# Patient Record
Sex: MALE | Race: WHITE | URBAN - METROPOLITAN AREA
[De-identification: names, ages, dates, MRNs, and addresses within clinical notes are randomized per-mention and may not be internally consistent; named-entity substitution may affect disease eponyms.]

---

## 2017-02-09 ENCOUNTER — APPOINTMENT (OUTPATIENT)
Dept: URBAN - METROPOLITAN AREA SURGERY 9 | Age: 67
Setting detail: DERMATOLOGY
End: 2017-02-09

## 2017-02-09 DIAGNOSIS — L85.3 XEROSIS CUTIS: ICD-10-CM

## 2017-02-09 PROBLEM — D48.5 NEOPLASM OF UNCERTAIN BEHAVIOR OF SKIN: Status: ACTIVE | Noted: 2017-02-09

## 2017-02-09 PROBLEM — E03.9 HYPOTHYROIDISM, UNSPECIFIED: Status: ACTIVE | Noted: 2017-02-09

## 2017-02-09 PROCEDURE — 11100: CPT

## 2017-02-09 PROCEDURE — OTHER BIOPSY BY SHAVE METHOD: OTHER

## 2017-02-09 PROCEDURE — OTHER COUNSELING: OTHER

## 2017-02-09 PROCEDURE — OTHER PATHOLOGY BILLING: OTHER

## 2017-02-09 PROCEDURE — 88305 TISSUE EXAM BY PATHOLOGIST: CPT | Mod: TC

## 2017-02-09 PROCEDURE — 99202 OFFICE O/P NEW SF 15 MIN: CPT | Mod: 25

## 2017-02-09 ASSESSMENT — LOCATION SIMPLE DESCRIPTION DERM: LOCATION SIMPLE: RIGHT UPPER BACK

## 2017-02-09 ASSESSMENT — LOCATION ZONE DERM: LOCATION ZONE: TRUNK

## 2017-02-09 ASSESSMENT — LOCATION DETAILED DESCRIPTION DERM: LOCATION DETAILED: RIGHT LATERAL UPPER BACK

## 2021-07-23 ENCOUNTER — HOSPITAL ENCOUNTER (EMERGENCY)
Dept: HOSPITAL 54 - ER | Age: 71
Discharge: NURSING, REHAB OR LONG-TERM CARE | End: 2021-07-23
Payer: COMMERCIAL

## 2021-07-23 VITALS — WEIGHT: 250 LBS | HEIGHT: 67 IN | BODY MASS INDEX: 39.24 KG/M2

## 2021-07-23 VITALS — DIASTOLIC BLOOD PRESSURE: 61 MMHG | SYSTOLIC BLOOD PRESSURE: 141 MMHG

## 2021-07-23 DIAGNOSIS — Z88.2: ICD-10-CM

## 2021-07-23 DIAGNOSIS — R31.9: ICD-10-CM

## 2021-07-23 DIAGNOSIS — Z79.82: ICD-10-CM

## 2021-07-23 DIAGNOSIS — Z79.899: ICD-10-CM

## 2021-07-23 DIAGNOSIS — E11.9: ICD-10-CM

## 2021-07-23 DIAGNOSIS — Z79.84: ICD-10-CM

## 2021-07-23 DIAGNOSIS — N36.9: Primary | ICD-10-CM

## 2021-07-23 LAB
BASOPHILS # BLD AUTO: 0.1 K/UL (ref 0–0.2)
BASOPHILS NFR BLD AUTO: 0.6 % (ref 0–2)
BILIRUB UR QL STRIP: NEGATIVE
BUN SERPL-MCNC: 18 MG/DL (ref 7–18)
CALCIUM SERPL-MCNC: 9.2 MG/DL (ref 8.5–10.1)
CHLORIDE SERPL-SCNC: 102 MMOL/L (ref 98–107)
CO2 SERPL-SCNC: 30 MMOL/L (ref 21–32)
COLOR UR: YELLOW
CREAT SERPL-MCNC: 1.1 MG/DL (ref 0.6–1.3)
DEPRECATED SQUAMOUS URNS QL MICRO: (no result) /HPF
EOSINOPHIL NFR BLD AUTO: 4 % (ref 0–6)
GLUCOSE SERPL-MCNC: 176 MG/DL (ref 74–106)
GLUCOSE UR STRIP-MCNC: NEGATIVE MG/DL
HCT VFR BLD AUTO: 44 % (ref 39–51)
HGB BLD-MCNC: 14.4 G/DL (ref 13.5–17.5)
LEUKOCYTE ESTERASE UR QL STRIP: (no result)
LYMPHOCYTES NFR BLD AUTO: 1.8 K/UL (ref 0.8–4.8)
LYMPHOCYTES NFR BLD AUTO: 21.8 % (ref 20–44)
MCHC RBC AUTO-ENTMCNC: 33 G/DL (ref 31–36)
MCV RBC AUTO: 94 FL (ref 80–96)
MONOCYTES NFR BLD AUTO: 0.7 K/UL (ref 0.1–1.3)
MONOCYTES NFR BLD AUTO: 8.3 % (ref 2–12)
NEUTROPHILS # BLD AUTO: 5.4 K/UL (ref 1.8–8.9)
NEUTROPHILS NFR BLD AUTO: 65.3 % (ref 43–81)
NITRITE UR QL STRIP: NEGATIVE
PH UR STRIP: 5.5 [PH] (ref 5–8)
PLATELET # BLD AUTO: 326 K/UL (ref 150–450)
POTASSIUM SERPL-SCNC: 4.1 MMOL/L (ref 3.5–5.1)
PROT UR QL STRIP: 30 MG/DL
RBC # BLD AUTO: 4.68 MIL/UL (ref 4.5–6)
RBC #/AREA URNS HPF: (no result) /HPF (ref 0–2)
SODIUM SERPL-SCNC: 139 MMOL/L (ref 136–145)
UROBILINOGEN UR STRIP-MCNC: 0.2 EU/DL
WBC #/AREA URNS HPF: (no result) /HPF
WBC #/AREA URNS HPF: (no result) /HPF (ref 0–3)
WBC NRBC COR # BLD AUTO: 8.2 K/UL (ref 4.3–11)

## 2021-07-23 NOTE — NUR
PHLEBOTOMIST AT BEDSIDE. URINE SAMPLE OBTAINED FROM NI BAG, WHICH WAS 
CHANGED YESTERDAY AT THE FACILITY.

## 2021-07-23 NOTE — NUR
REPORT GIVEN TO EMT. Patient discharged to FACILITY in stable condition. 
Written and verbal after care instructions given. Patient verbalizes 
understanding of instruction.

## 2022-07-22 ENCOUNTER — HOSPITAL ENCOUNTER (EMERGENCY)
Dept: HOSPITAL 54 - ER | Age: 72
LOS: 1 days | Discharge: HOME | End: 2022-07-23
Payer: MEDICARE

## 2022-07-22 VITALS — HEIGHT: 60 IN | BODY MASS INDEX: 46.53 KG/M2 | WEIGHT: 237 LBS

## 2022-07-22 DIAGNOSIS — Z88.8: ICD-10-CM

## 2022-07-22 DIAGNOSIS — E11.9: ICD-10-CM

## 2022-07-22 DIAGNOSIS — N39.0: ICD-10-CM

## 2022-07-22 DIAGNOSIS — Z79.899: ICD-10-CM

## 2022-07-22 DIAGNOSIS — Z88.2: ICD-10-CM

## 2022-07-22 DIAGNOSIS — R33.9: Primary | ICD-10-CM

## 2022-07-22 DIAGNOSIS — E78.5: ICD-10-CM

## 2022-07-22 PROCEDURE — 87086 URINE CULTURE/COLONY COUNT: CPT

## 2022-07-22 PROCEDURE — 51702 INSERT TEMP BLADDER CATH: CPT

## 2022-07-22 PROCEDURE — 81001 URINALYSIS AUTO W/SCOPE: CPT

## 2022-07-22 PROCEDURE — 87186 SC STD MICRODIL/AGAR DIL: CPT

## 2022-07-22 PROCEDURE — 99284 EMERGENCY DEPT VISIT MOD MDM: CPT

## 2022-07-23 VITALS — DIASTOLIC BLOOD PRESSURE: 79 MMHG | SYSTOLIC BLOOD PRESSURE: 140 MMHG

## 2022-07-23 LAB
BILIRUB UR QL STRIP: (no result)
COLOR UR: (no result)
GLUCOSE UR STRIP-MCNC: >=1000 MG/DL
LEUKOCYTE ESTERASE UR QL STRIP: NEGATIVE
NITRITE UR QL STRIP: POSITIVE
PH UR STRIP: 5.5 [PH] (ref 5–8)
PROT UR QL STRIP: 100 MG/DL
RBC #/AREA URNS HPF: (no result) /HPF (ref 0–2)
UROBILINOGEN UR STRIP-MCNC: 0.2 EU/DL
WBC #/AREA URNS HPF: (no result) /HPF (ref 0–3)

## 2022-07-25 ENCOUNTER — HOSPITAL ENCOUNTER (EMERGENCY)
Dept: HOSPITAL 54 - ER | Age: 72
Discharge: NURSING, REHAB OR LONG-TERM CARE | End: 2022-07-25
Payer: COMMERCIAL

## 2022-07-25 VITALS — BODY MASS INDEX: 33.18 KG/M2 | HEIGHT: 71 IN | WEIGHT: 237 LBS

## 2022-07-25 VITALS — DIASTOLIC BLOOD PRESSURE: 68 MMHG | SYSTOLIC BLOOD PRESSURE: 124 MMHG

## 2022-07-25 DIAGNOSIS — Z79.82: ICD-10-CM

## 2022-07-25 DIAGNOSIS — R33.9: Primary | ICD-10-CM

## 2022-07-25 DIAGNOSIS — E78.5: ICD-10-CM

## 2022-07-25 DIAGNOSIS — E11.9: ICD-10-CM

## 2022-07-25 DIAGNOSIS — Z88.2: ICD-10-CM

## 2022-07-25 DIAGNOSIS — Z79.899: ICD-10-CM

## 2022-07-25 DIAGNOSIS — Z88.8: ICD-10-CM

## 2022-07-25 PROCEDURE — 99283 EMERGENCY DEPT VISIT LOW MDM: CPT

## 2022-07-25 NOTE — NUR
DEBRA FROM SNF/snf W/ C/O SLOW-DRAINING URINARY CATHETER. PT W/ INDWELLING 
URINARY CATH ATTACHED TO LEG BAG, W/ DARK TEA-COLORED URINE. TO ER BED 2.

## 2022-07-25 NOTE — NUR
PT SEEN BY DR. SIMPSON. PER MD, URINARY CATH IS DRAINING OKAY AND NO NEED TO 
CHANGE CATHETER AT THIS TIME.

## 2022-07-25 NOTE — NUR
IRRIGATED NI CATH W/ 200CC NS. ABLE TO DRAIN URINE, TEA COLORED, 600CC IN 
AMOUNT. PT VERBALIZED RELIEF; NO BLADDER DISTENTION NOTED.

## 2022-07-25 NOTE — NUR
Patient discharged back to [facility] via private ambulance in stable condition. 
Written and verbal after care instructions given. Patient verbalizes 
understanding of instruction.

## 2022-07-30 ENCOUNTER — HOSPITAL ENCOUNTER (EMERGENCY)
Dept: HOSPITAL 54 - ER | Age: 72
Discharge: HOME | End: 2022-07-30
Payer: COMMERCIAL

## 2022-07-30 VITALS — WEIGHT: 211 LBS | HEIGHT: 66 IN | BODY MASS INDEX: 33.91 KG/M2

## 2022-07-30 VITALS — DIASTOLIC BLOOD PRESSURE: 63 MMHG | SYSTOLIC BLOOD PRESSURE: 134 MMHG

## 2022-07-30 DIAGNOSIS — T83.091A: Primary | ICD-10-CM

## 2022-07-30 DIAGNOSIS — Z88.8: ICD-10-CM

## 2022-07-30 DIAGNOSIS — Z79.84: ICD-10-CM

## 2022-07-30 DIAGNOSIS — Z79.82: ICD-10-CM

## 2022-07-30 DIAGNOSIS — Z79.899: ICD-10-CM

## 2022-07-30 DIAGNOSIS — E11.9: ICD-10-CM

## 2022-07-30 DIAGNOSIS — E78.5: ICD-10-CM

## 2022-07-30 DIAGNOSIS — Z88.2: ICD-10-CM

## 2022-07-30 NOTE — NUR
PT'S NI CATH FLUSHED WITH 20ML OF NS. AND OBTAINED PATENCY. URINE OUTP 800ML 
CLEAR YELLOW WITH BALCK SEDIMENTS. NO FOUL ODOR NOTED.

## 2022-07-30 NOTE — NUR
[facility] AT [facility] FOR TRANSPORT BACK TO HIS FACILITY. PT IS IN STABLE 
CONDITION FOR TRANSPORT.

## 2022-12-09 ENCOUNTER — HOSPITAL ENCOUNTER (EMERGENCY)
Dept: HOSPITAL 54 - ER | Age: 72
LOS: 1 days | Discharge: NURSING, REHAB OR LONG-TERM CARE | End: 2022-12-10
Payer: COMMERCIAL

## 2022-12-09 VITALS — BODY MASS INDEX: 33.91 KG/M2 | WEIGHT: 211 LBS | HEIGHT: 66 IN

## 2022-12-09 DIAGNOSIS — Z88.2: ICD-10-CM

## 2022-12-09 DIAGNOSIS — R33.9: Primary | ICD-10-CM

## 2022-12-09 DIAGNOSIS — Z46.6: ICD-10-CM

## 2022-12-09 DIAGNOSIS — E78.2: ICD-10-CM

## 2022-12-09 DIAGNOSIS — Z88.8: ICD-10-CM

## 2022-12-09 DIAGNOSIS — Z79.84: ICD-10-CM

## 2022-12-09 DIAGNOSIS — E11.9: ICD-10-CM

## 2022-12-09 NOTE — NUR
TO ER BED 11. BIB [facility] C/O PENILE PAIN. NO URINE OUTPUT IN F/C SINCE NOON. PT IS 
ALERT AND ORIENTED. RR EVEN AND NONLABORED. CONNECTED TO MONITOR. AWAITING MD SAAVEDRA Yes

## 2022-12-10 VITALS — SYSTOLIC BLOOD PRESSURE: 134 MMHG | DIASTOLIC BLOOD PRESSURE: 71 MMHG

## 2023-01-09 ENCOUNTER — HOSPITAL ENCOUNTER (EMERGENCY)
Dept: HOSPITAL 54 - ER | Age: 73
Discharge: HOME | End: 2023-01-09
Payer: COMMERCIAL

## 2023-01-09 VITALS — SYSTOLIC BLOOD PRESSURE: 135 MMHG | DIASTOLIC BLOOD PRESSURE: 65 MMHG

## 2023-01-09 VITALS — BODY MASS INDEX: 32.1 KG/M2 | HEIGHT: 72 IN | WEIGHT: 237 LBS

## 2023-01-09 DIAGNOSIS — Z79.84: ICD-10-CM

## 2023-01-09 DIAGNOSIS — E11.9: ICD-10-CM

## 2023-01-09 DIAGNOSIS — Z79.82: ICD-10-CM

## 2023-01-09 DIAGNOSIS — Z46.6: Primary | ICD-10-CM

## 2023-01-09 DIAGNOSIS — Z88.2: ICD-10-CM

## 2023-01-09 DIAGNOSIS — Z79.899: ICD-10-CM

## 2023-02-16 ENCOUNTER — HOSPITAL ENCOUNTER (EMERGENCY)
Dept: HOSPITAL 54 - ER | Age: 73
Discharge: NURSING, REHAB OR LONG-TERM CARE | End: 2023-02-16
Payer: COMMERCIAL

## 2023-02-16 VITALS — DIASTOLIC BLOOD PRESSURE: 80 MMHG | SYSTOLIC BLOOD PRESSURE: 142 MMHG

## 2023-02-16 VITALS — HEIGHT: 71 IN | BODY MASS INDEX: 33.6 KG/M2 | WEIGHT: 240 LBS

## 2023-02-16 DIAGNOSIS — E11.9: ICD-10-CM

## 2023-02-16 DIAGNOSIS — Z88.8: ICD-10-CM

## 2023-02-16 DIAGNOSIS — Z88.2: ICD-10-CM

## 2023-02-16 DIAGNOSIS — Z44.8: ICD-10-CM

## 2023-02-16 DIAGNOSIS — Z79.899: ICD-10-CM

## 2023-02-16 DIAGNOSIS — Z79.84: ICD-10-CM

## 2023-02-16 DIAGNOSIS — Z79.82: ICD-10-CM

## 2023-02-16 DIAGNOSIS — R33.9: Primary | ICD-10-CM

## 2023-02-16 DIAGNOSIS — E78.5: ICD-10-CM

## 2023-02-16 LAB
BILIRUB UR QL STRIP: NEGATIVE
COLOR UR: YELLOW
DEPRECATED SQUAMOUS URNS QL MICRO: (no result) /HPF
GLUCOSE UR STRIP-MCNC: (no result) MG/DL
LEUKOCYTE ESTERASE UR QL STRIP: NEGATIVE
NITRITE UR QL STRIP: NEGATIVE
PH UR STRIP: 5.5 [PH] (ref 5–8)
PROT UR QL STRIP: NEGATIVE MG/DL
RBC #/AREA URNS HPF: (no result) /HPF (ref 0–2)
UROBILINOGEN UR STRIP-MCNC: 0.2 EU/DL
WBC #/AREA URNS HPF: (no result) /HPF (ref 0–3)

## 2023-02-16 PROCEDURE — 99284 EMERGENCY DEPT VISIT MOD MDM: CPT

## 2023-02-16 PROCEDURE — 81001 URINALYSIS AUTO W/SCOPE: CPT

## 2023-02-16 PROCEDURE — 51702 INSERT TEMP BLADDER CATH: CPT

## 2023-02-16 NOTE — NUR
NI CATHETER REPLACED WITH 14 FR COUDEE ATTACHED TO LEG BAG. GOOD URINE 
RETURN, CLEAR AND NO HEMATURIA. URINE SAMPLE COLLECTED AND SENT TO LAB

## 2023-02-16 NOTE — NUR
PT GOING BACK TO [facility]. ADDRESS: [address] [place], [place] [address] 
[id number]. NUMBER: [phone]

## 2023-02-16 NOTE — NUR
BIB [facility] UNIT NUMBER [id number] FROM [facility] FOR URINARY CATHER 
CHANGE. PT DENIES DYSURIA, HEMATURIA. PT TRANSFERRED TO BED. CONNECTED TO 
MONITOR. SAFETY PRECAUTIONS PLACED. AWAITING MD ORDERS.

## 2023-02-16 NOTE — NUR
Patient discharged to [facility] via [facility] in stable condition 
accompanied by 2 emts. Written and verbal after care instructions given. 
Patient verbalizes understanding of instruction.

## 2023-03-27 ENCOUNTER — HOSPITAL ENCOUNTER (EMERGENCY)
Dept: HOSPITAL 54 - ER | Age: 73
Discharge: HOME | End: 2023-03-27
Payer: COMMERCIAL

## 2023-03-27 VITALS — DIASTOLIC BLOOD PRESSURE: 68 MMHG | SYSTOLIC BLOOD PRESSURE: 127 MMHG

## 2023-03-27 VITALS — HEIGHT: 71 IN | BODY MASS INDEX: 33.18 KG/M2 | WEIGHT: 237 LBS

## 2023-03-27 DIAGNOSIS — Z79.899: ICD-10-CM

## 2023-03-27 DIAGNOSIS — E11.9: ICD-10-CM

## 2023-03-27 DIAGNOSIS — Z88.8: ICD-10-CM

## 2023-03-27 DIAGNOSIS — N39.0: ICD-10-CM

## 2023-03-27 DIAGNOSIS — Z88.2: ICD-10-CM

## 2023-03-27 DIAGNOSIS — Z79.82: ICD-10-CM

## 2023-03-27 DIAGNOSIS — T83.098A: Primary | ICD-10-CM
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
## 2023-03-27 LAB
BILIRUB UR QL STRIP: NEGATIVE
COLOR UR: YELLOW
DEPRECATED SQUAMOUS URNS QL MICRO: (no result) /HPF
GLUCOSE UR STRIP-MCNC: (no result) MG/DL
LEUKOCYTE ESTERASE UR QL STRIP: (no result)
NITRITE UR QL STRIP: POSITIVE
PH UR STRIP: 5.5 [PH] (ref 5–8)
PROT UR QL STRIP: NEGATIVE MG/DL
RBC #/AREA URNS HPF: (no result) /HPF (ref 0–2)
UROBILINOGEN UR STRIP-MCNC: 0.2 EU/DL
WBC #/AREA URNS HPF: (no result) /HPF

## 2023-04-28 ENCOUNTER — HOSPITAL ENCOUNTER (EMERGENCY)
Dept: HOSPITAL 54 - ER | Age: 73
Discharge: HOME | End: 2023-04-28
Payer: COMMERCIAL

## 2023-04-28 VITALS — SYSTOLIC BLOOD PRESSURE: 125 MMHG | DIASTOLIC BLOOD PRESSURE: 68 MMHG

## 2023-04-28 VITALS — BODY MASS INDEX: 35.21 KG/M2 | WEIGHT: 260 LBS | HEIGHT: 72 IN

## 2023-04-28 DIAGNOSIS — Z44.8: Primary | ICD-10-CM

## 2023-04-28 DIAGNOSIS — E11.9: ICD-10-CM

## 2023-04-28 DIAGNOSIS — Z88.1: ICD-10-CM

## 2023-04-28 DIAGNOSIS — Z79.82: ICD-10-CM

## 2023-04-28 DIAGNOSIS — Z79.84: ICD-10-CM

## 2023-04-28 DIAGNOSIS — Z79.899: ICD-10-CM

## 2023-04-28 DIAGNOSIS — E78.5: ICD-10-CM

## 2023-04-28 DIAGNOSIS — Z88.2: ICD-10-CM

## 2023-04-28 NOTE — NUR
Patient discharged to home in stable condition via [facility]. Written and 
verbal after care instructions given. Patient verbalizes understanding of 
instruction.

## 2023-04-29 ENCOUNTER — HOSPITAL ENCOUNTER (EMERGENCY)
Dept: HOSPITAL 54 - ER | Age: 73
Discharge: NURSING, REHAB OR LONG-TERM CARE | End: 2023-04-29
Payer: COMMERCIAL

## 2023-04-29 VITALS — DIASTOLIC BLOOD PRESSURE: 91 MMHG | SYSTOLIC BLOOD PRESSURE: 146 MMHG

## 2023-04-29 VITALS — BODY MASS INDEX: 35.21 KG/M2 | HEIGHT: 72 IN | WEIGHT: 260 LBS

## 2023-04-29 DIAGNOSIS — Z79.899: ICD-10-CM

## 2023-04-29 DIAGNOSIS — R33.9: ICD-10-CM

## 2023-04-29 DIAGNOSIS — Z79.82: ICD-10-CM

## 2023-04-29 DIAGNOSIS — Z88.2: ICD-10-CM

## 2023-04-29 DIAGNOSIS — E11.9: ICD-10-CM

## 2023-04-29 DIAGNOSIS — T83.028A: Primary | ICD-10-CM

## 2023-04-29 DIAGNOSIS — Z88.1: ICD-10-CM

## 2025-02-11 ENCOUNTER — APPOINTMENT (OUTPATIENT)
Dept: URBAN - METROPOLITAN AREA SURGERY 9 | Age: 75
Setting detail: DERMATOLOGY
End: 2025-02-12

## 2025-02-11 DIAGNOSIS — L82.1 OTHER SEBORRHEIC KERATOSIS: ICD-10-CM

## 2025-02-11 DIAGNOSIS — L81.4 OTHER MELANIN HYPERPIGMENTATION: ICD-10-CM

## 2025-02-11 DIAGNOSIS — D22 MELANOCYTIC NEVI: ICD-10-CM

## 2025-02-11 DIAGNOSIS — L57.8 OTHER SKIN CHANGES DUE TO CHRONIC EXPOSURE TO NONIONIZING RADIATION: ICD-10-CM

## 2025-02-11 PROBLEM — D48.5 NEOPLASM OF UNCERTAIN BEHAVIOR OF SKIN: Status: ACTIVE | Noted: 2025-02-11

## 2025-02-11 PROBLEM — D22.5 MELANOCYTIC NEVI OF TRUNK: Status: ACTIVE | Noted: 2025-02-11

## 2025-02-11 PROCEDURE — 11102 TANGNTL BX SKIN SINGLE LES: CPT

## 2025-02-11 PROCEDURE — OTHER MIPS QUALITY: OTHER

## 2025-02-11 PROCEDURE — 99203 OFFICE O/P NEW LOW 30 MIN: CPT | Mod: 25

## 2025-02-11 PROCEDURE — OTHER REASSURANCE: OTHER

## 2025-02-11 PROCEDURE — OTHER SUNSCREEN RECOMMENDATIONS: OTHER

## 2025-02-11 PROCEDURE — OTHER BIOPSY BY SHAVE METHOD: OTHER

## 2025-02-11 PROCEDURE — OTHER COUNSELING: OTHER

## 2025-02-11 ASSESSMENT — LOCATION SIMPLE DESCRIPTION DERM
LOCATION SIMPLE: UPPER BACK
LOCATION SIMPLE: LEFT FOREARM
LOCATION SIMPLE: RIGHT UPPER BACK
LOCATION SIMPLE: INFERIOR FOREHEAD
LOCATION SIMPLE: RIGHT FOREARM
LOCATION SIMPLE: RIGHT SHOULDER

## 2025-02-11 ASSESSMENT — LOCATION ZONE DERM
LOCATION ZONE: ARM
LOCATION ZONE: TRUNK
LOCATION ZONE: FACE

## 2025-02-11 ASSESSMENT — LOCATION DETAILED DESCRIPTION DERM
LOCATION DETAILED: SUPERIOR THORACIC SPINE
LOCATION DETAILED: LEFT DISTAL DORSAL FOREARM
LOCATION DETAILED: RIGHT DISTAL DORSAL FOREARM
LOCATION DETAILED: INFERIOR THORACIC SPINE
LOCATION DETAILED: RIGHT SUPERIOR MEDIAL UPPER BACK
LOCATION DETAILED: INFERIOR MID FOREHEAD
LOCATION DETAILED: RIGHT POSTERIOR SHOULDER

## 2025-02-11 NOTE — PROCEDURE: BIOPSY BY SHAVE METHOD
Detail Level: Detailed
Depth Of Biopsy: dermis
Was A Bandage Applied: Yes
Size Of Lesion In Cm: 0.6
X Size Of Lesion In Cm: 0
Biopsy Type: H and E
Biopsy Method: Dermablade
Anesthesia Type: 1% lidocaine with epinephrine
Anesthesia Volume In Cc: 0.5
Hemostasis: Drysol
Wound Care: Petrolatum
Dressing: bandage
Destruction After The Procedure: No
Type Of Destruction Used: Curettage
Curettage Text: The wound bed was treated with curettage after the biopsy was performed.
Cryotherapy Text: The wound bed was treated with cryotherapy after the biopsy was performed.
Electrodesiccation Text: The wound bed was treated with electrodesiccation after the biopsy was performed.
Electrodesiccation And Curettage Text: The wound bed was treated with electrodesiccation and curettage after the biopsy was performed.
Silver Nitrate Text: The wound bed was treated with silver nitrate after the biopsy was performed.
Lab: [id number]
Lab Facility: [id number]
Medical Necessity Information: It is in your best interest to select a reason for this procedure from the list below. All of these items fulfill various CMS LCD requirements except the new and changing color options.
Consent: Written consent was obtained and risks were reviewed including but not limited to scarring, infection, bleeding, scabbing, incomplete removal, nerve damage and allergy to anesthesia.
Post-Care Instructions: I reviewed with the patient in detail post-care instructions. Patient is to keep the biopsy site dry overnight, and then apply bacitracin twice daily until healed. Patient may apply hydrogen peroxide soaks to remove any crusting.
Notification Instructions: Patient will be notified of biopsy results. However, patient instructed to call the office if not contacted within 2 weeks.
Billing Type: Third-Party Bill
Information: Selecting Yes will display possible errors in your note based on the variables you have selected. This validation is only offered as a suggestion for you. PLEASE NOTE THAT THE VALIDATION TEXT WILL BE REMOVED WHEN YOU FINALIZE YOUR NOTE. IF YOU WANT TO FAX A PRELIMINARY NOTE YOU WILL NEED TO TOGGLE THIS TO 'NO' IF YOU DO NOT WANT IT IN YOUR FAXED NOTE.

## 2025-02-26 ENCOUNTER — APPOINTMENT (OUTPATIENT)
Dept: URBAN - METROPOLITAN AREA SURGERY 9 | Age: 75
Setting detail: DERMATOLOGY
End: 2025-02-26

## 2025-02-26 ENCOUNTER — RX ONLY (RX ONLY)
Age: 75
End: 2025-02-26

## 2025-02-26 DIAGNOSIS — L72.0 EPIDERMAL CYST: ICD-10-CM

## 2025-02-26 PROBLEM — D48.5 NEOPLASM OF UNCERTAIN BEHAVIOR OF SKIN: Status: ACTIVE | Noted: 2025-02-26

## 2025-02-26 PROCEDURE — OTHER PRESCRIPTION: OTHER

## 2025-02-26 PROCEDURE — 99213 OFFICE O/P EST LOW 20 MIN: CPT

## 2025-02-26 PROCEDURE — OTHER MIPS QUALITY: OTHER

## 2025-02-26 RX ORDER — BETAMETHASONE DIPROPIONATE 0.5 MG/G
CREAM, AUGMENTED TOPICAL
Qty: 15 | Refills: 1 | Status: ERX | COMMUNITY
Start: 2025-02-26

## 2025-02-26 RX ORDER — BETAMETHASONE DIPROPIONATE 0.5 MG/G
CREAM, AUGMENTED TOPICAL
Qty: 15 | Refills: 1 | Status: CANCELLED | COMMUNITY
Start: 2025-02-26

## 2025-02-26 ASSESSMENT — LOCATION DETAILED DESCRIPTION DERM: LOCATION DETAILED: LEFT BUTTOCK

## 2025-02-26 ASSESSMENT — LOCATION ZONE DERM: LOCATION ZONE: TRUNK

## 2025-02-26 ASSESSMENT — LOCATION SIMPLE DESCRIPTION DERM: LOCATION SIMPLE: LEFT BUTTOCK

## 2025-02-27 ENCOUNTER — RX ONLY (RX ONLY)
Age: 75
End: 2025-02-27

## 2025-02-27 RX ORDER — BETAMETHASONE DIPROPIONATE 0.5 MG/G
CREAM, AUGMENTED TOPICAL
Qty: 15 | Refills: 1 | Status: ERX

## 2025-04-03 ENCOUNTER — APPOINTMENT (OUTPATIENT)
Dept: URBAN - METROPOLITAN AREA SURGERY 9 | Age: 75
Setting detail: DERMATOLOGY
End: 2025-04-03

## 2025-04-03 DIAGNOSIS — L89 PRESSURE ULCER: ICD-10-CM

## 2025-04-03 PROBLEM — L89.329 PRESSURE ULCER OF LEFT BUTTOCK, UNSPECIFIED STAGE: Status: ACTIVE | Noted: 2025-04-03

## 2025-04-03 PROBLEM — L89.319 PRESSURE ULCER OF RIGHT BUTTOCK, UNSPECIFIED STAGE: Status: ACTIVE | Noted: 2025-04-03

## 2025-04-03 PROCEDURE — OTHER PRESCRIPTION MEDICATION MANAGEMENT: OTHER

## 2025-04-03 PROCEDURE — OTHER PRESCRIPTION: OTHER

## 2025-04-03 PROCEDURE — OTHER COUNSELING: OTHER

## 2025-04-03 PROCEDURE — OTHER MIPS QUALITY: OTHER

## 2025-04-03 PROCEDURE — 99214 OFFICE O/P EST MOD 30 MIN: CPT

## 2025-04-03 RX ORDER — TRIAMCINOLONE ACETONIDE 1 MG/G
CREAM TOPICAL
Qty: 30 | Refills: 2 | Status: ERX | COMMUNITY
Start: 2025-04-03

## 2025-04-03 ASSESSMENT — LOCATION SIMPLE DESCRIPTION DERM
LOCATION SIMPLE: LEFT BUTTOCK
LOCATION SIMPLE: RIGHT BUTTOCK

## 2025-04-03 ASSESSMENT — LOCATION DETAILED DESCRIPTION DERM
LOCATION DETAILED: RIGHT MEDIAL BUTTOCK
LOCATION DETAILED: LEFT MEDIAL BUTTOCK

## 2025-04-03 ASSESSMENT — LOCATION ZONE DERM: LOCATION ZONE: TRUNK

## 2025-04-03 NOTE — PROCEDURE: PRESCRIPTION MEDICATION MANAGEMENT
Plan: Referral to [facility] wound care if this does not improve. 
Render In Strict Bullet Format?: No
Detail Level: Zone
Initiate Treatment: triamcinolone\\ndonut pillow\\ndecreasing sitting times\\nrepositioning
Continue Regimen: calmoseptine\\nTriad